# Patient Record
Sex: MALE | Race: BLACK OR AFRICAN AMERICAN | NOT HISPANIC OR LATINO | URBAN - NONMETROPOLITAN AREA
[De-identification: names, ages, dates, MRNs, and addresses within clinical notes are randomized per-mention and may not be internally consistent; named-entity substitution may affect disease eponyms.]

---

## 2021-03-10 ENCOUNTER — OFFICE VISIT (OUTPATIENT)
Dept: URBAN - NONMETROPOLITAN AREA CLINIC 2 | Facility: CLINIC | Age: 51
End: 2021-03-10
Payer: COMMERCIAL

## 2021-03-10 DIAGNOSIS — Z12.11 COLON CANCER SCREENING: ICD-10-CM

## 2021-03-10 DIAGNOSIS — K64.4 EXTERNAL HEMORRHOID: ICD-10-CM

## 2021-03-10 PROCEDURE — 99213 OFFICE O/P EST LOW 20 MIN: CPT | Performed by: NURSE PRACTITIONER

## 2021-03-10 RX ORDER — HYDROCORTISONE 25 MG/G
1 APPLICATION CREAM TOPICAL TWICE A DAY
Qty: 1 CANISTER | Refills: 3 | OUTPATIENT
Start: 2021-03-10 | End: 2021-05-05

## 2021-03-10 NOTE — HPI-TODAY'S VISIT:
Mr. Hollis is a 50-year-old male who presents with hemorrhoid.  He had a colonoscopy last summer that showed external hemorrhoids.  He reports they have never been an issue, until earlier this week.  Started having some bleeding and pain.  No additional complaints.  Reports BMs are regular.  Otherwise, he is healthy. Sb

## 2021-03-10 NOTE — PHYSICAL EXAM CHEST:
no lesions,  no deformities,  no traumatic injuries,  no significant scars are present,  chest wall non-tender,  no masses present, breathing is unlabored without accessory muscle use, normal breath sounds
No

## 2022-11-16 ENCOUNTER — LAB OUTSIDE AN ENCOUNTER (OUTPATIENT)
Dept: URBAN - METROPOLITAN AREA CLINIC 118 | Facility: CLINIC | Age: 52
End: 2022-11-16

## 2022-11-16 ENCOUNTER — DASHBOARD ENCOUNTERS (OUTPATIENT)
Age: 52
End: 2022-11-16

## 2022-11-16 ENCOUNTER — OFFICE VISIT (OUTPATIENT)
Dept: URBAN - METROPOLITAN AREA CLINIC 118 | Facility: CLINIC | Age: 52
End: 2022-11-16
Payer: COMMERCIAL

## 2022-11-16 VITALS
BODY MASS INDEX: 31.58 KG/M2 | HEART RATE: 70 BPM | HEIGHT: 70 IN | TEMPERATURE: 97.2 F | SYSTOLIC BLOOD PRESSURE: 119 MMHG | DIASTOLIC BLOOD PRESSURE: 73 MMHG | WEIGHT: 220.6 LBS

## 2022-11-16 DIAGNOSIS — R93.2 ABNORMAL ULTRASOUND OF LIVER: ICD-10-CM

## 2022-11-16 DIAGNOSIS — Z14.8 HEMOCHROMATOSIS CARRIER: ICD-10-CM

## 2022-11-16 PROBLEM — 15633881000119102: Status: ACTIVE | Noted: 2022-11-16

## 2022-11-16 PROBLEM — 66971000119103: Status: ACTIVE | Noted: 2022-11-16

## 2022-11-16 PROCEDURE — 99244 OFF/OP CNSLTJ NEW/EST MOD 40: CPT | Performed by: INTERNAL MEDICINE

## 2022-11-16 PROCEDURE — 99214 OFFICE O/P EST MOD 30 MIN: CPT | Performed by: INTERNAL MEDICINE

## 2022-11-16 NOTE — HPI-TODAY'S VISIT:
51 yo BM with a dx of carrier state for hereditary hemochromatosis since age ~31, and getting therapeutic phlembotomy x ~ 8 yrs, initially q 3 months, and none for the last 2 years.  Followed by Hematology with normal iron sat'n and ferritin.  Pt getting liver U/S q year, and referred today by Dr. Badillo, Hematology, for persistent heterogenous hyperechoic areas of liver on U/S in 3/23/22, that are apparently stable.  Per pt, LFTs normal. Otherwise doing well.  No abd pain, N/V.  Has 2 EtOH drinks/wk. No other known hx of liver disease.

## 2022-11-17 LAB
AFP, SERUM, TUMOR MARKER: 2.1
ALBUMIN/GLOBULIN RATIO: 2
ALBUMIN: 4.5
ALKALINE PHOSPHATASE: 56
ALT (SGPT): 31
AST (SGOT): 25
BILIRUBIN, DIRECT: 0.1
BILIRUBIN, INDIRECT: 0.4
BILIRUBIN, TOTAL: 0.5
GLOBULIN: 2.2
PROTEIN, TOTAL: 6.7